# Patient Record
Sex: MALE | Race: WHITE | Employment: OTHER | URBAN - METROPOLITAN AREA
[De-identification: names, ages, dates, MRNs, and addresses within clinical notes are randomized per-mention and may not be internally consistent; named-entity substitution may affect disease eponyms.]

---

## 2021-07-23 LAB
ALBUMIN SERPL-MCNC: 4.5 G/DL
ALP BLD-CCNC: 71 U/L
ALT SERPL-CCNC: 71 U/L
ANION GAP SERPL CALCULATED.3IONS-SCNC: NORMAL MMOL/L
ANTIBODY: NON REACTIVE
AST SERPL-CCNC: 19 U/L
BASOPHILS ABSOLUTE: 0.3 /ΜL
BASOPHILS RELATIVE PERCENT: 0.7 %
BILIRUB SERPL-MCNC: 0.4 MG/DL (ref 0.1–1.4)
BUN BLDV-MCNC: 14 MG/DL
CALCIUM SERPL-MCNC: 9.5 MG/DL
CHLORIDE BLD-SCNC: 101 MMOL/L
CHOLESTEROL, TOTAL: 187 MG/DL
CHOLESTEROL/HDL RATIO: 2.47
CO2: 30 MMOL/L
CREAT SERPL-MCNC: 0.9 MG/DL
EOSINOPHILS ABSOLUTE: 0.5 /ΜL
EOSINOPHILS RELATIVE PERCENT: 4.6 %
GFR CALCULATED: >90
GLUCOSE BLD-MCNC: 98 MG/DL
HCT VFR BLD CALC: 44.2 % (ref 41–53)
HDLC SERPL-MCNC: 43 MG/DL (ref 35–70)
HEMOGLOBIN: 14.8 G/DL (ref 13.5–17.5)
LDL CHOLESTEROL CALCULATED: 106 MG/DL (ref 0–160)
LYMPHOCYTES ABSOLUTE: 3 /ΜL
LYMPHOCYTES RELATIVE PERCENT: 42.4 %
MCH RBC QN AUTO: 29.2 PG
MCHC RBC AUTO-ENTMCNC: 33.5 G/DL
MCV RBC AUTO: 88.6 FL
MONOCYTES ABSOLUTE: 2.9 /ΜL
MONOCYTES RELATIVE PERCENT: 7.4 %
NEUTROPHILS ABSOLUTE: 0.1 /ΜL
NEUTROPHILS RELATIVE PERCENT: 44.8 %
NONHDLC SERPL-MCNC: NORMAL MG/DL
PDW BLD-RTO: 13.2 %
PLATELET # BLD: 246 K/ΜL
PMV BLD AUTO: 10 FL
POTASSIUM SERPL-SCNC: 3.2 MMOL/L
PROSTATE SPECIFIC ANTIGEN: 1.29 NG/ML
RBC # BLD: 4.99 10^6/ΜL
SODIUM BLD-SCNC: 141 MMOL/L
TOTAL PROTEIN: 7
TRIGL SERPL-MCNC: 188 MG/DL
VLDLC SERPL CALC-MCNC: 38 MG/DL
WBC # BLD: 6.8 10^3/ML

## 2022-05-22 SDOH — HEALTH STABILITY: PHYSICAL HEALTH: ON AVERAGE, HOW MANY DAYS PER WEEK DO YOU ENGAGE IN MODERATE TO STRENUOUS EXERCISE (LIKE A BRISK WALK)?: 3 DAYS

## 2022-05-22 SDOH — HEALTH STABILITY: PHYSICAL HEALTH: ON AVERAGE, HOW MANY MINUTES DO YOU ENGAGE IN EXERCISE AT THIS LEVEL?: 40 MIN

## 2022-05-22 ASSESSMENT — SOCIAL DETERMINANTS OF HEALTH (SDOH)

## 2022-05-25 ENCOUNTER — OFFICE VISIT (OUTPATIENT)
Dept: INTERNAL MEDICINE CLINIC | Age: 57
End: 2022-05-25
Payer: COMMERCIAL

## 2022-05-25 VITALS
BODY MASS INDEX: 34.13 KG/M2 | HEART RATE: 78 BPM | DIASTOLIC BLOOD PRESSURE: 68 MMHG | HEIGHT: 72 IN | SYSTOLIC BLOOD PRESSURE: 112 MMHG | OXYGEN SATURATION: 94 % | WEIGHT: 252 LBS

## 2022-05-25 DIAGNOSIS — Z11.4 SCREENING FOR HIV (HUMAN IMMUNODEFICIENCY VIRUS): ICD-10-CM

## 2022-05-25 DIAGNOSIS — I10 PRIMARY HYPERTENSION: ICD-10-CM

## 2022-05-25 DIAGNOSIS — K22.2 ESOPHAGEAL STRICTURE: ICD-10-CM

## 2022-05-25 DIAGNOSIS — Z12.11 SCREEN FOR COLON CANCER: ICD-10-CM

## 2022-05-25 DIAGNOSIS — Z00.00 ANNUAL PHYSICAL EXAM: Primary | ICD-10-CM

## 2022-05-25 DIAGNOSIS — G47.33 OSA TREATED WITH BIPAP: ICD-10-CM

## 2022-05-25 DIAGNOSIS — F98.8 ATTENTION DEFICIT DISORDER (ADD) WITHOUT HYPERACTIVITY: ICD-10-CM

## 2022-05-25 DIAGNOSIS — J30.89 ENVIRONMENTAL AND SEASONAL ALLERGIES: ICD-10-CM

## 2022-05-25 DIAGNOSIS — K21.9 CHRONIC GASTROESOPHAGEAL REFLUX DISEASE: ICD-10-CM

## 2022-05-25 PROCEDURE — 36415 COLL VENOUS BLD VENIPUNCTURE: CPT | Performed by: INTERNAL MEDICINE

## 2022-05-25 PROCEDURE — 99386 PREV VISIT NEW AGE 40-64: CPT | Performed by: INTERNAL MEDICINE

## 2022-05-25 RX ORDER — LOSARTAN POTASSIUM AND HYDROCHLOROTHIAZIDE 25; 100 MG/1; MG/1
1 TABLET ORAL DAILY
Qty: 90 TABLET | Refills: 1 | Status: SHIPPED | OUTPATIENT
Start: 2022-05-25

## 2022-05-25 RX ORDER — HYDROCHLOROTHIAZIDE 25 MG/1
25 TABLET ORAL DAILY
COMMUNITY
Start: 2022-05-08 | End: 2022-05-25 | Stop reason: ALTCHOICE

## 2022-05-25 RX ORDER — OMEPRAZOLE 40 MG/1
40 CAPSULE, DELAYED RELEASE ORAL DAILY
Qty: 90 CAPSULE | Refills: 3 | Status: SHIPPED | OUTPATIENT
Start: 2022-05-25 | End: 2022-09-28

## 2022-05-25 RX ORDER — LOSARTAN POTASSIUM 100 MG/1
100 TABLET ORAL DAILY
COMMUNITY
Start: 2022-05-08 | End: 2022-05-25 | Stop reason: ALTCHOICE

## 2022-05-25 RX ORDER — OMEPRAZOLE 40 MG/1
40 CAPSULE, DELAYED RELEASE ORAL DAILY
COMMUNITY
Start: 2022-04-28 | End: 2022-05-25 | Stop reason: SDUPTHER

## 2022-05-25 SDOH — ECONOMIC STABILITY: FOOD INSECURITY: WITHIN THE PAST 12 MONTHS, YOU WORRIED THAT YOUR FOOD WOULD RUN OUT BEFORE YOU GOT MONEY TO BUY MORE.: NEVER TRUE

## 2022-05-25 SDOH — ECONOMIC STABILITY: FOOD INSECURITY: WITHIN THE PAST 12 MONTHS, THE FOOD YOU BOUGHT JUST DIDN'T LAST AND YOU DIDN'T HAVE MONEY TO GET MORE.: NEVER TRUE

## 2022-05-25 ASSESSMENT — PATIENT HEALTH QUESTIONNAIRE - PHQ9
SUM OF ALL RESPONSES TO PHQ QUESTIONS 1-9: 0
2. FEELING DOWN, DEPRESSED OR HOPELESS: 0
SUM OF ALL RESPONSES TO PHQ QUESTIONS 1-9: 0
SUM OF ALL RESPONSES TO PHQ9 QUESTIONS 1 & 2: 0
SUM OF ALL RESPONSES TO PHQ QUESTIONS 1-9: 0
SUM OF ALL RESPONSES TO PHQ QUESTIONS 1-9: 0
1. LITTLE INTEREST OR PLEASURE IN DOING THINGS: 0

## 2022-05-25 ASSESSMENT — SOCIAL DETERMINANTS OF HEALTH (SDOH): HOW HARD IS IT FOR YOU TO PAY FOR THE VERY BASICS LIKE FOOD, HOUSING, MEDICAL CARE, AND HEATING?: NOT HARD AT ALL

## 2022-05-25 NOTE — PROGRESS NOTES
[facility]  History and Physical  Emma Stiles M.D. Porfirio Hood  YOB: 1965    Date of Service:  2022    Chief Complaint:   Porfirio Hood is a 60 y.o. male who presents for   Chief Complaint   Patient presents with    Establish Care       Assessment/Plan:    Dyllan Kendrick was seen today for establish care. Diagnoses and all orders for this visit:    Annual physical exam  -     Lipid Panel  -     Comprehensive Metabolic Panel  -     CBC with Auto Differential    Screening for HIV (human immunodeficiency virus)  -     HIV Screen    Screen for colon cancer  -     AFL - Ronni Flowers MD, Gastroenterology (ERCP & EUS), [facility]    Primary hypertension  -     losartan-hydroCHLOROthiazide (HYZAAR) 100-25 MG per tablet; Take 1 tablet by mouth daily    Esophageal stricture  -     omeprazole (PRILOSEC) 40 MG delayed release capsule; Take 1 capsule by mouth daily    Chronic gastroesophageal reflux disease  -     omeprazole (PRILOSEC) 40 MG delayed release capsule; Take 1 capsule by mouth daily    Environmental and seasonal allergies    NATE treated with BiPAP    Attention deficit disorder (ADD) without hyperactivity      Return VV 6 months on BP, GERD, allergies. HPI: Here for Annual Physical and Follow up. Hypertension:  Home blood pressure monitorin-130/80's on Losartan 100 mg daily and hctz 25 mg qd. He is adherent to a low sodium diet. Patient denies chest pain, shortness of breath, headache, lightheadedness, blurred vision, peripheral edema, palpitations, dry cough and fatigue. Antihypertensive medication side effects: no medication side effects noted. Use of agents associated with hypertension: none. GERD with esophageal stricture:  Stable on Prilosec 40 mg qd                                   Environmental allergies:  Stable on zyrtec 10 mg daily  NATE on BIPAP  ADD:  Stable off medication.     No results found for: [facility] Results Component Value Date     07/23/2021    K 3.2 07/23/2021     07/23/2021    CO2 30 07/23/2021    BUN 14 07/23/2021    CREATININE 0.9 07/23/2021    GLUCOSE 98 07/23/2021    CALCIUM 9.5 07/23/2021     Lab Results   Component Value Date    CHOL 187 07/23/2021    TRIG 188 07/23/2021    HDL 43 07/23/2021    LDLCALC 106 07/23/2021     Lab Results   Component Value Date    ALT 71 07/23/2021    AST 19 07/23/2021     No results found for: TSH, T4FREE, T3FREE  Lab Results   Component Value Date    WBC 6.8 07/23/2021    HGB 14.8 07/23/2021    HCT 44.2 07/23/2021    MCV 88.6 07/23/2021     07/23/2021     No results found for: INR   Lab Results   Component Value Date    PSA 1.29 07/23/2021      No results found for: LABURIC     Wt Readings from Last 3 Encounters:   05/25/22 252 lb (114.3 kg)     BP Readings from Last 3 Encounters:   05/25/22 112/68       Patient Active Problem List   Diagnosis    Primary hypertension    Esophageal stricture    Chronic gastroesophageal reflux disease    Attention deficit disorder (ADD) without hyperactivity    Environmental and seasonal allergies       No Known Allergies  Outpatient Medications Marked as Taking for the 5/25/22 encounter (Office Visit) with Kurtis Torres MD   Medication Sig Dispense Refill    Cetirizine HCl 10 MG CAPS Take by mouth daily      hydroCHLOROthiazide (HYDRODIURIL) 25 MG tablet Take 25 mg by mouth daily      losartan (COZAAR) 100 MG tablet Take 100 mg by mouth daily      omeprazole (PRILOSEC) 40 MG delayed release capsule Take 40 mg by mouth daily         Past Medical History:   Diagnosis Date    Esophageal stricture     Hypertension     Sleep apnea      Past Surgical History:   Procedure Laterality Date    CHOLECYSTECTOMY  2015     Family History   Problem Relation Age of Onset    Thyroid Disease Mother     Ulcerative Colitis Father     Depression Sister     Heart Attack Maternal Grandmother 48        had MI and passed    Depression Sister     Depression Sister     ADHD Sister      Social History     Socioeconomic History    Marital status:      Spouse name: Not on file    Number of children: Not on file    Years of education: Not on file    Highest education level: Not on file   Occupational History    Not on file   Tobacco Use    Smoking status: Former Smoker     Packs/day: 1.00     Years: 10.00     Pack years: 10.00     Types: Cigarettes     Start date: 1979     Quit date: 2000     Years since quittin.7    Smokeless tobacco: Never Used    Tobacco comment: Stopped for a period of time of 2 years or more 3 times before I quit permanently. Vaping Use    Vaping Use: Never used   Substance and Sexual Activity    Alcohol use: Yes     Alcohol/week: 3.0 standard drinks     Types: 1 Glasses of wine, 2 Standard drinks or equivalent per week     Comment: occasional    Drug use: Never    Sexual activity: Yes     Partners: Female   Other Topics Concern    Not on file   Social History Narrative    Not on file     Social Determinants of Health     Financial Resource Strain: Low Risk     Difficulty of Paying Living Expenses: Not hard at all   Food Insecurity: No Food Insecurity    Worried About [facility] in the Last Year: Never true    [facility] in the Last Year: Never true   Transportation Needs:     Lack of Transportation (Medical): Not on file    Lack of Transportation (Non-Medical):  Not on file   Physical Activity: Insufficiently Active    Days of Exercise per Week: 3 days    Minutes of Exercise per Session: 40 min   Stress:     Feeling of Stress : Not on file   Social Connections:     Frequency of Communication with Friends and Family: Not on file    Frequency of Social Gatherings with Friends and Family: Not on file    Attends [employer] Services: Not on file    Active Member of Clubs or Organizations: Not on file    Attends Club or Organization Meetings: Not on file    Marital Status: Not on file   Intimate Partner Violence: Not At Risk    Fear of Current or Ex-Partner: No    Emotionally Abused: No    Physically Abused: No    Sexually Abused: No   Housing Stability:     Unable to Pay for Housing in the Last Year: Not on file    Number of Places Lived in the Last Year: Not on file    Unstable Housing in the Last Year: Not on file       Review of Systems:  A comprehensive review of systems was negative except for what was noted in the HPI. Physical Exam:   Vitals:    05/25/22 1017   BP: 112/68   Pulse: 78   SpO2: 94%   Weight: 252 lb (114.3 kg)   Height: 6' (1.829 m)     Body mass index is 34.18 kg/m². Constitutional: He is oriented to person, place, and time. He appears well-developed and well-nourished. No distress. HEENT:   Head: Normocephalic and atraumatic. Right Ear: Tympanic membrane, external ear and ear canal normal.   Left Ear: Tympanic membrane, external ear and ear canal normal.   Mouth/Throat: Oropharynx is clear and moist and mucous membranes are normal. No oropharyngeal exudate or posterior oropharyngeal erythema. He has no cervical adenopathy. Eyes: Conjunctivae and extraocular motions are normal. Pupils are equal, round, and reactive to light. Neck:  Supple. No JVD present. Carotid bruit is not present. No mass and no thyromegaly present. Cardiovascular: Normal rate, regular rhythm, normal heart sounds and intact distal pulses. Pulmonary/Chest: Effort normal and breath sounds normal. No respiratory distress. He has no wheezes, rhonchi or rales. Abdominal: Soft, non-tender. Bowel sounds and aorta are normal. There is no organomegaly, mass or bruit. Musculoskeletal: Normal range of motion. He exhibits no edema. Neurological: He is alert and oriented to person, place, and time. He has normal reflexes. No cranial nerve deficit. Coordination normal.   Skin: Skin is warm, dry and intact. No suspicious lesions are noted.     Preventive Care:  Health Maintenance   Topic Date Due    Depression Screen  Never done    HIV screen  Never done    Colorectal Cancer Screen  Never done    Prostate Specific Antigen (PSA) Screening or Monitoring  07/23/2022    DTaP/Tdap/Td vaccine (2 - Td or Tdap) 09/16/2023    Lipids  07/23/2026    Flu vaccine  Completed    Shingles vaccine  Completed    COVID-19 Vaccine  Completed    Hepatitis C screen  Completed    Hepatitis A vaccine  Aged Out    Hepatitis B vaccine  Aged Out    Hib vaccine  Aged Out    Meningococcal (ACWY) vaccine  Aged Out    Pneumococcal 0-64 years Vaccine  Aged Out        Recommendations for WAYN Due: see orders and patient instructions/AVS.

## 2022-05-26 DIAGNOSIS — E78.00 HYPERCHOLESTEREMIA: Primary | ICD-10-CM

## 2022-05-26 DIAGNOSIS — Z91.89 RISK FOR CORONARY ARTERY DISEASE LESS THAN 10% IN NEXT 10 YEARS: ICD-10-CM

## 2022-05-26 LAB
A/G RATIO: 2 (ref 1.1–2.2)
ALBUMIN SERPL-MCNC: 5.1 G/DL (ref 3.4–5)
ALP BLD-CCNC: 89 U/L (ref 40–129)
ALT SERPL-CCNC: 25 U/L (ref 10–40)
ANION GAP SERPL CALCULATED.3IONS-SCNC: 23 MMOL/L (ref 3–16)
AST SERPL-CCNC: 22 U/L (ref 15–37)
BASOPHILS ABSOLUTE: 0 K/UL (ref 0–0.2)
BASOPHILS RELATIVE PERCENT: 0.5 %
BILIRUB SERPL-MCNC: 0.4 MG/DL (ref 0–1)
BUN BLDV-MCNC: 17 MG/DL (ref 7–20)
CALCIUM SERPL-MCNC: 10.1 MG/DL (ref 8.3–10.6)
CHLORIDE BLD-SCNC: 100 MMOL/L (ref 99–110)
CHOLESTEROL, TOTAL: 220 MG/DL (ref 0–199)
CO2: 22 MMOL/L (ref 21–32)
CREAT SERPL-MCNC: 1 MG/DL (ref 0.9–1.3)
EOSINOPHILS ABSOLUTE: 0.2 K/UL (ref 0–0.6)
EOSINOPHILS RELATIVE PERCENT: 3.3 %
GFR AFRICAN AMERICAN: >60
GFR NON-AFRICAN AMERICAN: >60
GLUCOSE BLD-MCNC: 104 MG/DL (ref 70–99)
HCT VFR BLD CALC: 44.1 % (ref 40.5–52.5)
HDLC SERPL-MCNC: 45 MG/DL (ref 40–60)
HEMOGLOBIN: 14.9 G/DL (ref 13.5–17.5)
HIV AG/AB: NORMAL
HIV ANTIGEN: NORMAL
HIV-1 ANTIBODY: NORMAL
HIV-2 AB: NORMAL
LDL CHOLESTEROL CALCULATED: 118 MG/DL
LYMPHOCYTES ABSOLUTE: 2.6 K/UL (ref 1–5.1)
LYMPHOCYTES RELATIVE PERCENT: 38.2 %
MCH RBC QN AUTO: 29.9 PG (ref 26–34)
MCHC RBC AUTO-ENTMCNC: 33.7 G/DL (ref 31–36)
MCV RBC AUTO: 88.5 FL (ref 80–100)
MONOCYTES ABSOLUTE: 0.5 K/UL (ref 0–1.3)
MONOCYTES RELATIVE PERCENT: 7.4 %
NEUTROPHILS ABSOLUTE: 3.4 K/UL (ref 1.7–7.7)
NEUTROPHILS RELATIVE PERCENT: 50.6 %
PDW BLD-RTO: 13.6 % (ref 12.4–15.4)
PLATELET # BLD: 242 K/UL (ref 135–450)
PMV BLD AUTO: 8.1 FL (ref 5–10.5)
POTASSIUM SERPL-SCNC: 3.8 MMOL/L (ref 3.5–5.1)
RBC # BLD: 4.98 M/UL (ref 4.2–5.9)
SODIUM BLD-SCNC: 145 MMOL/L (ref 136–145)
TOTAL PROTEIN: 7.6 G/DL (ref 6.4–8.2)
TRIGL SERPL-MCNC: 284 MG/DL (ref 0–150)
VLDLC SERPL CALC-MCNC: 57 MG/DL
WBC # BLD: 6.7 K/UL (ref 4–11)

## 2022-05-26 RX ORDER — ROSUVASTATIN CALCIUM 5 MG/1
5 TABLET, COATED ORAL NIGHTLY
Qty: 90 TABLET | Refills: 1 | Status: SHIPPED | OUTPATIENT
Start: 2022-05-26 | End: 2022-09-28

## 2022-09-28 RX ORDER — OMEPRAZOLE 40 MG/1
40 CAPSULE, DELAYED RELEASE ORAL DAILY
COMMUNITY

## 2022-09-30 ENCOUNTER — ANESTHESIA EVENT (OUTPATIENT)
Dept: ENDOSCOPY | Age: 57
End: 2022-09-30
Payer: COMMERCIAL

## 2022-10-02 NOTE — H&P
Depression Sister     Depression Sister     ADHD Sister      SOCIAL HISTORY     Social History     Socioeconomic History    Marital status:      Spouse name: Not on file    Number of children: Not on file    Years of education: Not on file    Highest education level: Not on file   Occupational History    Not on file   Tobacco Use    Smoking status: Former     Packs/day: 1.00     Years: 10.00     Pack years: 10.00     Types: Cigarettes     Start date: 1979     Quit date: 2000     Years since quittin.0    Smokeless tobacco: Never    Tobacco comments:     Stopped for a period of time of 2 years or more 3 times before I quit permanently. Vaping Use    Vaping Use: Never used   Substance and Sexual Activity    Alcohol use:  Yes     Alcohol/week: 3.0 standard drinks     Types: 1 Glasses of wine, 2 Standard drinks or equivalent per week     Comment: 3 drinks per week    Drug use: Never    Sexual activity: Yes     Partners: Female   Other Topics Concern    Not on file   Social History Narrative    Not on file     Social Determinants of Health     Financial Resource Strain: Low Risk     Difficulty of Paying Living Expenses: Not hard at all   Food Insecurity: No Food Insecurity    Worried About Running Out of Food in the Last Year: Never true    Ran Out of Food in the Last Year: Never true   Transportation Needs: Not on file   Physical Activity: Insufficiently Active    Days of Exercise per Week: 3 days    Minutes of Exercise per Session: 40 min   Stress: Not on file   Social Connections: Not on file   Intimate Partner Violence: Not At Risk    Fear of Current or Ex-Partner: No    Emotionally Abused: No    Physically Abused: No    Sexually Abused: No   Housing Stability: Not on file     SURGICAL HISTORY     Past Surgical History:   Procedure Laterality Date    CHOLECYSTECTOMY       CURRENT MEDICATIONS   (This list may include medications prescribed during this encounter as epic can not insert only the list prior to this encounter.)  Current Outpatient Rx   Medication Sig Dispense Refill    omeprazole (PRILOSEC) 40 MG delayed release capsule Take 40 mg by mouth daily      Multiple Vitamin (MULTIVITAMIN PO) Take by mouth daily      Cetirizine HCl 10 MG CAPS Take by mouth daily      losartan-hydroCHLOROthiazide (HYZAAR) 100-25 MG per tablet Take 1 tablet by mouth daily 90 tablet 1     ALLERGIES   No Known Allergies  IMMUNIZATIONS     Immunization History   Administered Date(s) Administered    COVID-19, PFIZER PURPLE top, DILUTE for use, (age 15 y+), 30mcg/0.3mL 03/18/2021, 04/06/2021, 12/08/2021     REVIEW OF SYSTEMS   See HPI for further details and pertinent postiives. Negative for the following:  Constitutional: Negative for weight change. Negative for appetite change and fatigue. HENT: Negative for nosebleeds, sore throat, mouth sores, and voice change. Respiratory: Negative for cough, choking and chest tightness. Cardiovascular: Negative for chest pain   Gastrointestinal: See HPI  Musculoskeletal: Negative for arthralgias. Skin: Negative for pallor. Neurological: Negative for weakness and light-headedness. Hematological: Negative for adenopathy. Does not bruise/bleed easily. Psychiatric/Behavioral: Negative for suicidal ideas. PHYSICAL EXAM   VITAL SIGNS: Ht 6' (1.829 m)   Wt 250 lb (113.4 kg)   BMI 33.91 kg/m²   Wt Readings from Last 3 Encounters:   05/25/22 252 lb (114.3 kg)     Constitutional: Well developed, Well nourished, No acute distress, Non-toxic appearance. HENT: Normocephalic, Atraumatic, Bilateral external ears normal, Oropharynx moist, No oral exudates, Nose normal.   Eyes: Conjunctiva normal, No discharge. Neck: Normal range of motion, No tenderness, Supple  Cardiovascular: Normal heart rate, Normal rhythm, No murmurs, No rubs, No gallops. Thorax & Lungs: Normal breath sounds, No respiratory distress, No wheezing, No chest tenderness.   Abdomen: normal bowel sounds, soft, non tender, non distended, no hernias   Rectal:  Deferred. Skin: Warm, Dry, No erythema, No rash. Lower Extremities: Intact distal pulses, No edema, No tenderness, No cyanosis, No clubbing. Neurologic: Alert & oriented x 3  RADIOLOGY/PROCEDURES   No results found. FINAL IMPRESSION   History of colon polyp    Plan: colonoscopy    Colonoscopy with alternatives, rationale, benefits and risks, not limited to bleeding, infection, perforation, need of surgery, prolong hospital stay and anesthesia side effects were explained. Patient verbalized understanding and agrees to proceed with colonoscopy. ORDERED FUTURE/PENDING TESTS       FOLLOWUP   No follow-ups on file.           Stewart Scott MD 10/2/22 1:32 PM EDT    CC:  Kimberley Gonzalez MD

## 2022-10-03 ENCOUNTER — ANESTHESIA (OUTPATIENT)
Dept: ENDOSCOPY | Age: 57
End: 2022-10-03
Payer: COMMERCIAL

## 2022-10-03 ENCOUNTER — HOSPITAL ENCOUNTER (OUTPATIENT)
Age: 57
Setting detail: OUTPATIENT SURGERY
Discharge: HOME | End: 2022-10-03
Attending: INTERNAL MEDICINE | Admitting: INTERNAL MEDICINE
Payer: COMMERCIAL

## 2022-10-03 VITALS
BODY MASS INDEX: 33.86 KG/M2 | HEART RATE: 60 BPM | OXYGEN SATURATION: 96 % | RESPIRATION RATE: 20 BRPM | TEMPERATURE: 97.2 F | SYSTOLIC BLOOD PRESSURE: 125 MMHG | WEIGHT: 250 LBS | HEIGHT: 72 IN | DIASTOLIC BLOOD PRESSURE: 86 MMHG

## 2022-10-03 DIAGNOSIS — Z12.11 SCREENING FOR COLON CANCER: ICD-10-CM

## 2022-10-03 PROCEDURE — 3700000000 HC ANESTHESIA ATTENDED CARE: Performed by: INTERNAL MEDICINE

## 2022-10-03 PROCEDURE — 2720000010 HC SURG SUPPLY STERILE: Performed by: INTERNAL MEDICINE

## 2022-10-03 PROCEDURE — 3609010600 HC COLONOSCOPY POLYPECTOMY SNARE/COLD BIOPSY: Performed by: INTERNAL MEDICINE

## 2022-10-03 PROCEDURE — 88305 TISSUE EXAM BY PATHOLOGIST: CPT

## 2022-10-03 PROCEDURE — 3609010300 HC COLONOSCOPY W/BIOPSY SINGLE/MULTIPLE: Performed by: INTERNAL MEDICINE

## 2022-10-03 PROCEDURE — 2709999900 HC NON-CHARGEABLE SUPPLY: Performed by: INTERNAL MEDICINE

## 2022-10-03 PROCEDURE — 3700000001 HC ADD 15 MINUTES (ANESTHESIA): Performed by: INTERNAL MEDICINE

## 2022-10-03 PROCEDURE — 7100000010 HC PHASE II RECOVERY - FIRST 15 MIN: Performed by: INTERNAL MEDICINE

## 2022-10-03 PROCEDURE — 6360000002 HC RX W HCPCS

## 2022-10-03 PROCEDURE — 2580000003 HC RX 258: Performed by: INTERNAL MEDICINE

## 2022-10-03 PROCEDURE — 2500000003 HC RX 250 WO HCPCS

## 2022-10-03 PROCEDURE — 3609009900 HC COLONOSCOPY W/CONTROL BLEEDING ANY METHOD: Performed by: INTERNAL MEDICINE

## 2022-10-03 PROCEDURE — 7100000011 HC PHASE II RECOVERY - ADDTL 15 MIN: Performed by: INTERNAL MEDICINE

## 2022-10-03 PROCEDURE — 3609019800 HC COLONOSCOPY WITH SUBMUCOSAL INJECTION: Performed by: INTERNAL MEDICINE

## 2022-10-03 RX ORDER — LIDOCAINE HYDROCHLORIDE 10 MG/ML
INJECTION, SOLUTION INFILTRATION; PERINEURAL PRN
Status: DISCONTINUED | OUTPATIENT
Start: 2022-10-03 | End: 2022-10-03 | Stop reason: SDUPTHER

## 2022-10-03 RX ORDER — GLYCOPYRROLATE 0.2 MG/ML
INJECTION INTRAMUSCULAR; INTRAVENOUS PRN
Status: DISCONTINUED | OUTPATIENT
Start: 2022-10-03 | End: 2022-10-03 | Stop reason: SDUPTHER

## 2022-10-03 RX ORDER — PROPOFOL 10 MG/ML
INJECTION, EMULSION INTRAVENOUS PRN
Status: DISCONTINUED | OUTPATIENT
Start: 2022-10-03 | End: 2022-10-03 | Stop reason: SDUPTHER

## 2022-10-03 RX ORDER — SODIUM CHLORIDE 0.9 % (FLUSH) 0.9 %
5-40 SYRINGE (ML) INJECTION PRN
Status: DISCONTINUED | OUTPATIENT
Start: 2022-10-03 | End: 2022-10-03 | Stop reason: HOSPADM

## 2022-10-03 RX ORDER — SODIUM CHLORIDE 9 MG/ML
INJECTION, SOLUTION INTRAVENOUS PRN
Status: DISCONTINUED | OUTPATIENT
Start: 2022-10-03 | End: 2022-10-03 | Stop reason: HOSPADM

## 2022-10-03 RX ORDER — LIDOCAINE HYDROCHLORIDE 10 MG/ML
0.1 INJECTION, SOLUTION EPIDURAL; INFILTRATION; INTRACAUDAL; PERINEURAL
Status: DISCONTINUED | OUTPATIENT
Start: 2022-10-03 | End: 2022-10-03 | Stop reason: HOSPADM

## 2022-10-03 RX ORDER — SODIUM CHLORIDE, SODIUM LACTATE, POTASSIUM CHLORIDE, CALCIUM CHLORIDE 600; 310; 30; 20 MG/100ML; MG/100ML; MG/100ML; MG/100ML
INJECTION, SOLUTION INTRAVENOUS ONCE
Status: COMPLETED | OUTPATIENT
Start: 2022-10-03 | End: 2022-10-03

## 2022-10-03 RX ORDER — SODIUM CHLORIDE, SODIUM LACTATE, POTASSIUM CHLORIDE, CALCIUM CHLORIDE 600; 310; 30; 20 MG/100ML; MG/100ML; MG/100ML; MG/100ML
INJECTION, SOLUTION INTRAVENOUS CONTINUOUS
Status: DISCONTINUED | OUTPATIENT
Start: 2022-10-03 | End: 2022-10-03 | Stop reason: HOSPADM

## 2022-10-03 RX ORDER — SODIUM CHLORIDE 0.9 % (FLUSH) 0.9 %
5-40 SYRINGE (ML) INJECTION EVERY 12 HOURS SCHEDULED
Status: DISCONTINUED | OUTPATIENT
Start: 2022-10-03 | End: 2022-10-03 | Stop reason: HOSPADM

## 2022-10-03 RX ADMIN — PROPOFOL 50 MG: 10 INJECTION, EMULSION INTRAVENOUS at 12:19

## 2022-10-03 RX ADMIN — PROPOFOL 100 MG: 10 INJECTION, EMULSION INTRAVENOUS at 12:09

## 2022-10-03 RX ADMIN — GLYCOPYRROLATE 0.2 MG: 0.2 INJECTION, SOLUTION INTRAMUSCULAR; INTRAVENOUS at 12:14

## 2022-10-03 RX ADMIN — PROPOFOL 50 MG: 10 INJECTION, EMULSION INTRAVENOUS at 12:15

## 2022-10-03 RX ADMIN — GLYCOPYRROLATE 0.2 MG: 0.2 INJECTION, SOLUTION INTRAMUSCULAR; INTRAVENOUS at 12:22

## 2022-10-03 RX ADMIN — LIDOCAINE HYDROCHLORIDE 30 MG: 10 INJECTION, SOLUTION INFILTRATION; PERINEURAL at 12:09

## 2022-10-03 RX ADMIN — PROPOFOL 50 MG: 10 INJECTION, EMULSION INTRAVENOUS at 12:36

## 2022-10-03 RX ADMIN — PROPOFOL 50 MG: 10 INJECTION, EMULSION INTRAVENOUS at 12:34

## 2022-10-03 RX ADMIN — PROPOFOL 50 MG: 10 INJECTION, EMULSION INTRAVENOUS at 12:28

## 2022-10-03 RX ADMIN — PROPOFOL 50 MG: 10 INJECTION, EMULSION INTRAVENOUS at 12:32

## 2022-10-03 RX ADMIN — PROPOFOL 50 MG: 10 INJECTION, EMULSION INTRAVENOUS at 12:22

## 2022-10-03 RX ADMIN — PROPOFOL 50 MG: 10 INJECTION, EMULSION INTRAVENOUS at 12:12

## 2022-10-03 RX ADMIN — PROPOFOL 50 MG: 10 INJECTION, EMULSION INTRAVENOUS at 12:17

## 2022-10-03 RX ADMIN — SODIUM CHLORIDE, POTASSIUM CHLORIDE, SODIUM LACTATE AND CALCIUM CHLORIDE: 600; 310; 30; 20 INJECTION, SOLUTION INTRAVENOUS at 11:34

## 2022-10-03 ASSESSMENT — PAIN - FUNCTIONAL ASSESSMENT: PAIN_FUNCTIONAL_ASSESSMENT: 0-10

## 2022-10-03 NOTE — ANESTHESIA POSTPROCEDURE EVALUATION
Department of Anesthesiology  Postprocedure Note    Patient: Xin Hogan  MRN: [id number]  YOB: 1965  Date of evaluation: 10/3/2022      Procedure Summary     Date: 10/03/22 Room / Location: [place] [address] / [facility]    Anesthesia Start: 0468 Anesthesia Stop: 1246    Procedures:       COLONOSCOPY WITH BIOPSY      COLONOSCOPY SUBMUCOSAL/ORISE INJECTION      COLONOSCOPY POLYPECTOMY SNARE/HOT BIOPSY      COLONOSCOPY CONTROL HEMORRHAGE Diagnosis:       Screening for colon cancer      (Screening for colon cancer [Z12.11])    Surgeons: Felecia Miller MD Responsible Provider: Armando Azevedo MD    Anesthesia Type: general ASA Status: 2          Anesthesia Type: No value filed.     Matthieu Phase I: Matthieu Score: 10    Matthieu Phase II: Matthieu Score: 9      Anesthesia Post Evaluation    Patient location during evaluation: PACU  Patient participation: complete - patient participated  Level of consciousness: awake and alert  Airway patency: patent  Nausea & Vomiting: no nausea and no vomiting  Complications: no  Cardiovascular status: hemodynamically stable  Respiratory status: acceptable, room air and spontaneous ventilation  Hydration status: stable  Comments: --------------------            10/03/22               1310     --------------------   BP:       125/86     Pulse:      60       Resp:       20       Temp:                SpO2:      96%      --------------------

## 2022-10-03 NOTE — DISCHARGE INSTRUCTIONS
PATIENT INSTRUCTIONS  POST-SEDATION    Yamilet Emms          IMMEDIATELY FOLLOWING PROCEDURE:    Do not drive or operate machinery for the first twenty four hours after surgery. Do not make any important decisions for twenty four hours after surgery or while taking narcotic pain medications or sedatives. You should NOT BE LEFT UNATTENDED OR ALONE. A responsible adult should be with you for the rest of the day of your procedure and also during the night for your protection and safety. You may experience some light headedness. Rest at home with activity as tolerated. You may not need to go to bed, but it is important to rest for the next 24 hours. You should not engage in athletic sports such as basketball, volleyball, jogging, skating, or activities requiring refined motor skills for 24 hours. If you develop intractable nausea and vomiting or a severe headache please notify your doctor immediately. You are not expected to have any fever, but if you feel warm, take your temperature. If you have a fever 101 degrees or higher, call your doctor. If you have had an Endoscopy:   *Eat lightly for your first meal and gradually resume your normal / prescribed diet. *If you have had a colonoscopy, do not expect a normal bowel movement for approximately three days due to the cleansing of the large intestine prior to colonoscopy. ONCE YOU ARE HOME, IF YOU SHOULD HAVE:  Difficulty in breathing, persistent nausea or vomiting, bleeding you feel is excessive, or pain that is unusual, increased abdominal bloating, or any swelling, fever / chills, call your physician. If you cannot contact your physician, but feel that your signs and symptoms need a physician's attention, go to the Emergency Department. FOLLOW-UP:    Please follow up with [email or web address] as scheduled or needed. Dr. Katty Chegn MD will call you with the biopsy findings. Call Dr. Katty Cheng MD if there are any GI concerns. [phone]    Repeat Colonoscopy pending pathology     You may be receiving a follow up phone call to ask about your care. Colonoscopy: What to Expect at [facility]  After you have a colonoscopy, you will stay at the clinic for 1 to 2 hours until the medicines wear off. Then you can go home. But you will need to arrange for a ride. Your doctor will tell you when you can eat and do your other usual activities. Your doctor will talk to you about when you will need your next colonoscopy. Your doctor can help you decide how often you need to be checked. This will depend on the results of your test and your risk for colorectal cancer. After the test, you may be bloated or have gas pains. You may need to pass gas. If a biopsy was done or a polyp was removed, you may have streaks of blood in your stool (feces) for a few days. Problems such as heavy rectal bleeding may not occur until several weeks after the test. This isn't common. But it can happen after polyps are removed. This care sheet gives you a general idea about how long it will take for you to recover. But each person recovers at a different pace. Follow the steps below to get better as quickly as possible. How can you care for yourself at home? Activity    Rest when you feel tired. You can do your normal activities when it feels okay to do so. Diet    Follow your doctor's directions for eating. Unless your doctor has told you not to, drink plenty of fluids. This helps to replace the fluids that were lost during the colon prep. Do not drink alcohol. Medicines    Your doctor will tell you if and when you can restart your medicines. He or she will also give you instructions about taking any new medicines. If you take blood thinners, such as warfarin (Coumadin), clopidogrel (Plavix), or aspirin, be sure to talk to your doctor. He or she will tell you if and when to start taking those medicines again.  Make sure that you understand exactly what your doctor wants you to do. If polyps were removed or a biopsy was done during the test, your doctor may tell you not to take aspirin or other anti-inflammatory medicines for a few days. These include ibuprofen (Advil, Motrin) and naproxen (Aleve). Other instructions    For your safety, do not drive or operate machinery until the medicine wears off and you can think clearly. Your doctor may tell you not to drive or operate machinery until the day after your test.     Do not sign legal documents or make major decisions until the medicine wears off and you can think clearly. The anesthesia can make it hard for you to fully understand what you are agreeing to. Follow-up care is a key part of your treatment and safety. Be sure to make and go to all appointments, and call your doctor if you are having problems. It's also a good idea to know your test results and keep a list of the medicines you take. When should you call for help? Call 911 anytime you think you may need emergency care. For example, call if:    You passed out (lost consciousness). You pass maroon or bloody stools. You have trouble breathing. Call your doctor now or seek immediate medical care if:    You have pain that does not get better after you take pain medicine. You are sick to your stomach or cannot drink fluids. You have new or worse belly pain. You have blood in your stools. You have a fever. You cannot pass stools or gas. Watch closely for changes in your health, and be sure to contact your doctor if you have any problems. Where can you learn more? Go to [email or web address] org and sign in to your [employer] account. Enter E264 in the [employer] box to learn more about \"Colonoscopy: What to Expect at Home. \"     If you do not have an account, please click on the \"Sign Up Now\" link. Current as of:  May 12, 2017  Content Version: 11.6  © 6056-1932 [employer], [employer]. Care instructions adapted under license by [facility] ([facility]). If you have questions about a medical condition or this instruction, always ask your healthcare professional. [address] any warranty or liability for your use of this information.

## 2022-10-03 NOTE — OP NOTE
[address] [facility] [address],  [address], [address]  Phone: [phone]   YKL:[phone]   [address]  [facility]  Phone: [phone]   SANDRA:[phone]      Colonoscopy Procedure Note    Patient: Tayo Yo  : 1965    Procedure: Colonoscopy with scopic mucosal resection, biopsy    Date:  10/3/2022     Endoscopist:  Walter Butler MD    Referring Physician:  Edinson Francois MD    Preoperative Diagnosis:  Screening for colon cancer [Z12.11]    Postoperative Diagnosis: Transverse colon polyp, sigmoid diverticulosis, internal hemorrhoids    Anesthesia: Anesthesia: MAC  Sedation:  Propofol per anesthesia  Start Time: 1210  Stop Time: 1239  ASA Class: 2  Mallampati: III (soft palate, base of uvula visible)    Indications: This is a 59y.o. year old male with medical history of hypertension, GERD,  NATE on BiPAP and colon polyp seen independently with referral for surveillance colonoscopy. Procedure Details  Informed consent was obtained for the procedure, including sedation. Risks of perforation, hemorrhage, adverse drug reaction and aspiration were discussed. The patient was placed in the left lateral decubitus position. Based on the pre-procedure assessment, including review of the patient's medical history, medications, allergies, and review of systems, he had been deemed to be an appropriate candidate for above IV sedation; he was therefore sedated and monitored continuously with ECG tracing, pulse oximetry, blood pressure monitoring, and direct observations. Rectal examination was performed. There were no external hemorrhoids, fissures or skin tags. The colonoscope was inserted into the rectum and advanced under direct vision to the terminal ileum. The right colon was examined twice as this increases polyp detection especially if other right colon polyps, older age, male, or bautista syndrome. The quality of the colonic preparation was excellent.   A careful inspection was made as the colonoscope was withdrawn, including a retroflexed view of the rectum; findings and interventions are described below. Appropriate photodocumentation Was Obtained: terminal ileum, appendiceal orifice and ileocecal valve. Findings:   Medium sized non bleeding diverticula in the sigmoid colon. A 20 mm broad-based semi-pedunculated polyp in the transverse colon, injected with 2 mL Orise and removed by en-bloc endoscopic mucosal resection and retrieved. No bleeding noted. 2 hemostatic clips applied for mucosal defect closure and prevent delayed bleeding. A 10 mm post polypectomy scar seen in the ascending colon with an adjacent 5 mm polypoid mucosa, removed by cold forceps and retrieved for pathology. No bleeding noted. Normal appearing terminal ileum. Medium sized non bleeding internal hemorrhoids.     - PREP: Suprep  - Overall difficulty: mild in degree  - Abdominal pressure: no   - Change in position: no  - Anesthesia issues: no    Specimens: Was Obtained:     Complications:   None; patient tolerated the procedure well. Disposition:   PACU - hemodynamically stable. Estimated Blood loss:  Minimal to none. Withdrawal Time:  24 minutes    Impression:   Medium sized non bleeding diverticula in the sigmoid colon. A 20 mm broad-based semi-pedunculated polyp in the transverse colon, injected and removed by en-bloc endoscopic mucosal resection and retrieved. 2 hemostatic clips applied  A 10 mm post polypectomy scar seen in the ascending colon with an adjacent 5 mm polypoid mucosa, removed. Normal appearing terminal ileum. Medium sized non bleeding internal hemorrhoids. Recommendations:  -Await pathology. ,   -Repeat colonoscopy in 3 years. ,   -High fiber diet. ,   -Follow up with primary care physician.  -Patient has  the contact number available for emergencies. Signs and symptoms of potential delayed complications were discussed with the patient.   Return to normal activities tomorrow. Written discharge instructions were provided to the patient. Samuel Godoy MD   [facility]   10/3/22       Please note that some or all of this record was generated using voice recognition software. If there are any questions about the content of this document, please contact the author as some errors in translation may have occurred.

## 2022-10-03 NOTE — ANESTHESIA PRE PROCEDURE
Department of Anesthesiology  Preprocedure Note       Name:  Montana Li   Age:  59 y.o.  :  1965                                          MRN:  [id number]         Date:  10/3/2022      Surgeon: Kunal Vu):  Mehran Camacho MD    Procedure: Procedure(s):  COLONOSCOPY    Medications prior to admission:   Prior to Admission medications    Medication Sig Start Date End Date Taking?  Authorizing Provider   omeprazole (PRILOSEC) 40 MG delayed release capsule Take 40 mg by mouth daily   Yes Historical Provider, MD   Multiple Vitamin (MULTIVITAMIN PO) Take by mouth daily   Yes Historical Provider, MD   Cetirizine HCl 10 MG CAPS Take by mouth daily    Historical Provider, MD   losartan-hydroCHLOROthiazide (HYZAAR) 100-25 MG per tablet Take 1 tablet by mouth daily 22   Zuleyma Torres MD       Current medications:    Current Facility-Administered Medications   Medication Dose Route Frequency Provider Last Rate Last Admin    lactated ringers infusion   IntraVENous Once Mehran Cmaacho MD        lidocaine PF 1 % injection 0.1 mL  0.1 mL IntraDERmal Once PRN Mehran Camacho MD        lactated ringers infusion   IntraVENous Continuous Mert Benitez MD        sodium chloride flush 0.9 % injection 5-40 mL  5-40 mL IntraVENous 2 times per day Mert Benitez MD        sodium chloride flush 0.9 % injection 5-40 mL  5-40 mL IntraVENous PRN Mert Benitez MD        0.9 % sodium chloride infusion   IntraVENous PRN Mert Benitez MD           Allergies:  No Known Allergies    Problem List:    Patient Active Problem List   Diagnosis Code    Primary hypertension I10    Esophageal stricture K22.2    Chronic gastroesophageal reflux disease K21.9    Attention deficit disorder (ADD) without hyperactivity F98.8    Environmental and seasonal allergies J30.89    NATE treated with BiPAP G47.33       Past Medical History:        Diagnosis Date    Acid reflux     Esophageal stricture     Hypertension  Sleep apnea     uses bipap-central sleep apnea       Past Surgical History:        Procedure Laterality Date    CHOLECYSTECTOMY  2015       Social History:    Social History     Tobacco Use    Smoking status: Former     Packs/day: 1.00     Years: 10.00     Pack years: 10.00     Types: Cigarettes     Start date: 1979     Quit date: 2000     Years since quittin.1    Smokeless tobacco: Never    Tobacco comments:     Stopped for a period of time of 2 years or more 3 times before I quit permanently. Substance Use Topics    Alcohol use: Yes     Alcohol/week: 3.0 standard drinks     Types: 1 Glasses of wine, 2 Standard drinks or equivalent per week     Comment: 3 drinks per week                                Counseling given: Not Answered  Tobacco comments: Stopped for a period of time of 2 years or more 3 times before I quit permanently. Vital Signs (Current):   Vitals:    22 1428 10/03/22 1128   BP:  133/82   Pulse:  65   Resp:  18   Temp:  97.2 °F (36.2 °C)   TempSrc:  Temporal   SpO2:  96%   Weight: 250 lb (113.4 kg) 250 lb (113.4 kg)   Height: 6' (1.829 m) 6' (1.829 m)                                              BP Readings from Last 3 Encounters:   10/03/22 133/82   22 112/68       NPO Status: Time of last liquid consumption: 0700                        Time of last solid consumption:                         Date of last liquid consumption: 10/03/22                        Date of last solid food consumption: 10/01/22    BMI:   Wt Readings from Last 3 Encounters:   10/03/22 250 lb (113.4 kg)   22 252 lb (114.3 kg)     Body mass index is 33.91 kg/m².     CBC:   Lab Results   Component Value Date/Time    WBC 6.7 2022 10:45 AM    RBC 4.98 2022 10:45 AM    HGB 14.9 2022 10:45 AM    HCT 44.1 2022 10:45 AM    MCV 88.5 2022 10:45 AM    RDW 13.6 2022 10:45 AM     2022 10:45 AM       CMP:   Lab Results   Component Value Date/Time     05/25/2022 10:45 AM    K 3.8 05/25/2022 10:45 AM     05/25/2022 10:45 AM    CO2 22 05/25/2022 10:45 AM    BUN 17 05/25/2022 10:45 AM    CREATININE 1.0 05/25/2022 10:45 AM    GFRAA >60 05/25/2022 10:45 AM    AGRATIO 2.0 05/25/2022 10:45 AM    LABGLOM >60 05/25/2022 10:45 AM    GLUCOSE 104 05/25/2022 10:45 AM    PROT 7.6 05/25/2022 10:45 AM    CALCIUM 10.1 05/25/2022 10:45 AM    BILITOT 0.4 05/25/2022 10:45 AM    ALKPHOS 89 05/25/2022 10:45 AM    AST 22 05/25/2022 10:45 AM    ALT 25 05/25/2022 10:45 AM       POC Tests: No results for input(s): POCGLU, POCNA, POCK, POCCL, POCBUN, POCHEMO, POCHCT in the last 72 hours. Coags: No results found for: PROTIME, INR, APTT    HCG (If Applicable): No results found for: PREGTESTUR, PREGSERUM, HCG, HCGQUANT     ABGs: No results found for: PHART, PO2ART, MFA6NPO, ZIW2WSC, BEART, H3VVPJMG     Type & Screen (If Applicable):  No results found for: LABABO, LABRH    Drug/Infectious Status (If Applicable):  No results found for: HIV, HEPCAB    COVID-19 Screening (If Applicable): No results found for: COVID19        Anesthesia Evaluation  Patient summary reviewed no history of anesthetic complications:   Airway: Mallampati: II  TM distance: >3 FB   Neck ROM: full  Mouth opening: > = 3 FB   Dental:          Pulmonary:normal exam  breath sounds clear to auscultation  (+) sleep apnea:                             Cardiovascular:    (+) hypertension:,         Rhythm: regular  Rate: normal                    Neuro/Psych:               GI/Hepatic/Renal:   (+) GERD:,           Endo/Other: Negative Endo/Other ROS                    Abdominal:             Vascular: Other Findings:           Anesthesia Plan      general     ASA 2       Induction: intravenous. Anesthetic plan and risks discussed with patient. Plan discussed with CRNA.     Attending anesthesiologist reviewed and agrees with Cesilia Reid MD 10/3/2022

## 2022-11-25 DIAGNOSIS — I10 PRIMARY HYPERTENSION: ICD-10-CM

## 2022-11-28 RX ORDER — LOSARTAN POTASSIUM AND HYDROCHLOROTHIAZIDE 25; 100 MG/1; MG/1
TABLET ORAL
Qty: 90 TABLET | Refills: 1 | OUTPATIENT
Start: 2022-11-28

## 2023-02-27 SDOH — ECONOMIC STABILITY: FOOD INSECURITY: WITHIN THE PAST 12 MONTHS, YOU WORRIED THAT YOUR FOOD WOULD RUN OUT BEFORE YOU GOT MONEY TO BUY MORE.: NEVER TRUE

## 2023-02-27 SDOH — ECONOMIC STABILITY: FOOD INSECURITY: WITHIN THE PAST 12 MONTHS, THE FOOD YOU BOUGHT JUST DIDN'T LAST AND YOU DIDN'T HAVE MONEY TO GET MORE.: NEVER TRUE

## 2023-02-27 SDOH — ECONOMIC STABILITY: HOUSING INSECURITY
IN THE LAST 12 MONTHS, WAS THERE A TIME WHEN YOU DID NOT HAVE A STEADY PLACE TO SLEEP OR SLEPT IN A SHELTER (INCLUDING NOW)?: NO

## 2023-02-27 SDOH — ECONOMIC STABILITY: TRANSPORTATION INSECURITY
IN THE PAST 12 MONTHS, HAS LACK OF TRANSPORTATION KEPT YOU FROM MEETINGS, WORK, OR FROM GETTING THINGS NEEDED FOR DAILY LIVING?: NO

## 2023-02-27 SDOH — ECONOMIC STABILITY: INCOME INSECURITY: HOW HARD IS IT FOR YOU TO PAY FOR THE VERY BASICS LIKE FOOD, HOUSING, MEDICAL CARE, AND HEATING?: NOT HARD AT ALL

## 2023-02-28 ENCOUNTER — TELEMEDICINE (OUTPATIENT)
Dept: INTERNAL MEDICINE CLINIC | Age: 58
End: 2023-02-28
Payer: COMMERCIAL

## 2023-02-28 DIAGNOSIS — Z91.89 RISK FOR CORONARY ARTERY DISEASE BETWEEN 10% AND 20% IN NEXT 10 YEARS: ICD-10-CM

## 2023-02-28 DIAGNOSIS — K21.9 CHRONIC GASTROESOPHAGEAL REFLUX DISEASE: ICD-10-CM

## 2023-02-28 DIAGNOSIS — G47.33 OSA TREATED WITH BIPAP: ICD-10-CM

## 2023-02-28 DIAGNOSIS — I10 PRIMARY HYPERTENSION: Primary | ICD-10-CM

## 2023-02-28 DIAGNOSIS — K22.2 ESOPHAGEAL STRICTURE: ICD-10-CM

## 2023-02-28 PROCEDURE — G8427 DOCREV CUR MEDS BY ELIG CLIN: HCPCS | Performed by: INTERNAL MEDICINE

## 2023-02-28 PROCEDURE — 99214 OFFICE O/P EST MOD 30 MIN: CPT | Performed by: INTERNAL MEDICINE

## 2023-02-28 PROCEDURE — 3017F COLORECTAL CA SCREEN DOC REV: CPT | Performed by: INTERNAL MEDICINE

## 2023-02-28 RX ORDER — LOSARTAN POTASSIUM AND HYDROCHLOROTHIAZIDE 25; 100 MG/1; MG/1
1 TABLET ORAL DAILY
Qty: 90 TABLET | Refills: 0 | Status: SHIPPED | OUTPATIENT
Start: 2023-02-28

## 2023-02-28 ASSESSMENT — PATIENT HEALTH QUESTIONNAIRE - PHQ9
SUM OF ALL RESPONSES TO PHQ QUESTIONS 1-9: 0
1. LITTLE INTEREST OR PLEASURE IN DOING THINGS: 0
SUM OF ALL RESPONSES TO PHQ9 QUESTIONS 1 & 2: 0
2. FEELING DOWN, DEPRESSED OR HOPELESS: 0

## 2023-02-28 NOTE — PATIENT INSTRUCTIONS
[facility]  [address], [address]  [place], [place] [address]  Phone: [phone]

## 2023-02-28 NOTE — PROGRESS NOTES
Pursuant to the emergency declaration under the [facility] waiver authority and the [employer] and [employer] Act, this Virtual  Video Visit was conducted, with patient's consent, to reduce the patient's risk of exposure to COVID-19 and provide continuity of care. Service is  provided through a video synchronous discussion virtually to substitute for in-person clinic visit with the patient being at home and Dr. Ethel Chery being at home. Patient consent to the video visit. Date of Service:  2023    Chief Complaint:      Chief Complaint   Patient presents with    Hypertension       Assessment/Plan:    Jose Godfrey was seen today for hypertension. Diagnoses and all orders for this visit:    Primary hypertension  -     losartan-hydroCHLOROthiazide (HYZAAR) 100-25 MG per tablet; Take 1 tablet by mouth daily    Risk for coronary artery disease between 10% and 20% in next 10 years  He will think about starting statin on next visit if unable to bring cholesterol down    Chronic gastroesophageal reflux disease    Esophageal stricture    NATE treated with BiPAP  -     [facility]      Return keep  Fasting PE.      HPI:  Eulalio Hassan is a 59 y.o. Hypertension:  Home blood pressure monitorin-130/80's on Losartan-hctz 100/25 mg daily. He is adherent to a low sodium diet. Patient denies chest pain, shortness of breath, headache, lightheadedness, blurred vision, peripheral edema, palpitations, dry cough and fatigue. Antihypertensive medication side effects: no medication side effects noted. Use of agents associated with hypertension: none. GERD with esophageal stricture:  Stable on Prilosec 40 mg qd                                   Environmental allergies:  Stable on zyrtec 10 mg daily  NATE on BIPAP  ADD:  Stable off medication. Hyperlipidemia:  Patient is  following a low fat, low cholesterol diet.   He is exercising regularly. OTC Supplements: none.      The 10-year ASCVD risk score (Diego PARSONS, et al., 2019) is: 10.5%    Values used to calculate the score:      Age: 59 years      Sex: Male      Is Non- : No      Diabetic: No      Tobacco smoker: No      Systolic Blood Pressure: 634 mmHg      Is BP treated: Yes      HDL Cholesterol: 45 mg/dL      Total Cholesterol: 220 mg/dL    No results found for: LABA1C, LABMICR  Lab Results   Component Value Date     05/25/2022    K 3.8 05/25/2022     05/25/2022    CO2 22 05/25/2022    BUN 17 05/25/2022    CREATININE 1.0 05/25/2022    GLUCOSE 104 (H) 05/25/2022    CALCIUM 10.1 05/25/2022     Lab Results   Component Value Date/Time    CHOL 220 05/25/2022 10:45 AM    TRIG 284 05/25/2022 10:45 AM    HDL 45 05/25/2022 10:45 AM    LDLCALC 118 05/25/2022 10:45 AM     Lab Results   Component Value Date    ALT 25 05/25/2022    AST 22 05/25/2022     No results found for: TSH, T4FREE, T3FREE  Lab Results   Component Value Date    WBC 6.7 05/25/2022    HGB 14.9 05/25/2022    HCT 44.1 05/25/2022    MCV 88.5 05/25/2022     05/25/2022     No results found for: INR   Lab Results   Component Value Date    PSA 1.29 07/23/2021      No results found for: [facility]     Patient Active Problem List   Diagnosis    Primary hypertension    Esophageal stricture    Chronic gastroesophageal reflux disease    Attention deficit disorder (ADD) without hyperactivity    Environmental and seasonal allergies    NATE treated with BiPAP       No Known Allergies  Outpatient Medications Marked as Taking for the 2/28/23 encounter (Telemedicine) with Pauline Torres MD   Medication Sig Dispense Refill    omeprazole (PRILOSEC) 40 MG delayed release capsule Take 40 mg by mouth daily      Multiple Vitamin (MULTIVITAMIN PO) Take by mouth daily      Cetirizine HCl 10 MG CAPS Take by mouth daily      losartan-hydroCHLOROthiazide (HYZAAR) 100-25 MG per tablet Take 1 tablet by mouth daily 90 tablet 1         Review of Systems: 14 systems were negative except of what was stated on HPI    Nursing note and vitals reviewed. There were no vitals filed for this visit. Wt Readings from Last 3 Encounters:   10/03/22 250 lb (113.4 kg)   05/25/22 252 lb (114.3 kg)     BP Readings from Last 3 Encounters:   10/03/22 125/86   05/25/22 112/68     There is no height or weight on file to calculate BMI. Constitutional: Patient appears well-developed and well-nourished. No distress. Head: Normocephalic and atraumatic. Psychiatric: Normal mood and affect.  Behavior is normal.

## 2023-05-26 DIAGNOSIS — I10 PRIMARY HYPERTENSION: ICD-10-CM

## 2023-05-30 RX ORDER — LOSARTAN POTASSIUM AND HYDROCHLOROTHIAZIDE 25; 100 MG/1; MG/1
TABLET ORAL
Qty: 90 TABLET | Refills: 0 | OUTPATIENT
Start: 2023-05-30

## 2023-06-01 ENCOUNTER — OFFICE VISIT (OUTPATIENT)
Dept: FAMILY MEDICINE CLINIC | Age: 58
End: 2023-06-01
Payer: COMMERCIAL

## 2023-06-01 VITALS
DIASTOLIC BLOOD PRESSURE: 70 MMHG | SYSTOLIC BLOOD PRESSURE: 115 MMHG | WEIGHT: 259.8 LBS | HEART RATE: 76 BPM | OXYGEN SATURATION: 98 % | BODY MASS INDEX: 35.19 KG/M2 | HEIGHT: 72 IN

## 2023-06-01 DIAGNOSIS — Z00.00 WELL ADULT EXAM: ICD-10-CM

## 2023-06-01 DIAGNOSIS — Z12.83 SCREENING EXAM FOR SKIN CANCER: ICD-10-CM

## 2023-06-01 DIAGNOSIS — K21.9 CHRONIC GASTROESOPHAGEAL REFLUX DISEASE: ICD-10-CM

## 2023-06-01 DIAGNOSIS — Z00.00 WELL ADULT EXAM: Primary | ICD-10-CM

## 2023-06-01 DIAGNOSIS — I10 PRIMARY HYPERTENSION: ICD-10-CM

## 2023-06-01 DIAGNOSIS — G47.33 OSA TREATED WITH BIPAP: ICD-10-CM

## 2023-06-01 PROCEDURE — 1036F TOBACCO NON-USER: CPT | Performed by: STUDENT IN AN ORGANIZED HEALTH CARE EDUCATION/TRAINING PROGRAM

## 2023-06-01 PROCEDURE — G8427 DOCREV CUR MEDS BY ELIG CLIN: HCPCS | Performed by: STUDENT IN AN ORGANIZED HEALTH CARE EDUCATION/TRAINING PROGRAM

## 2023-06-01 PROCEDURE — 99386 PREV VISIT NEW AGE 40-64: CPT | Performed by: STUDENT IN AN ORGANIZED HEALTH CARE EDUCATION/TRAINING PROGRAM

## 2023-06-01 PROCEDURE — 3078F DIAST BP <80 MM HG: CPT | Performed by: STUDENT IN AN ORGANIZED HEALTH CARE EDUCATION/TRAINING PROGRAM

## 2023-06-01 PROCEDURE — 99203 OFFICE O/P NEW LOW 30 MIN: CPT | Performed by: STUDENT IN AN ORGANIZED HEALTH CARE EDUCATION/TRAINING PROGRAM

## 2023-06-01 PROCEDURE — 3074F SYST BP LT 130 MM HG: CPT | Performed by: STUDENT IN AN ORGANIZED HEALTH CARE EDUCATION/TRAINING PROGRAM

## 2023-06-01 PROCEDURE — G8417 CALC BMI ABV UP PARAM F/U: HCPCS | Performed by: STUDENT IN AN ORGANIZED HEALTH CARE EDUCATION/TRAINING PROGRAM

## 2023-06-01 PROCEDURE — 3017F COLORECTAL CA SCREEN DOC REV: CPT | Performed by: STUDENT IN AN ORGANIZED HEALTH CARE EDUCATION/TRAINING PROGRAM

## 2023-06-01 RX ORDER — LOSARTAN POTASSIUM AND HYDROCHLOROTHIAZIDE 25; 100 MG/1; MG/1
1 TABLET ORAL DAILY
Qty: 90 TABLET | Refills: 1 | Status: SHIPPED | OUTPATIENT
Start: 2023-06-01

## 2023-06-01 RX ORDER — OMEPRAZOLE 40 MG/1
40 CAPSULE, DELAYED RELEASE ORAL DAILY
Qty: 90 CAPSULE | Refills: 1 | Status: SHIPPED | OUTPATIENT
Start: 2023-06-01

## 2023-06-01 NOTE — PROGRESS NOTES
also reviewed. Recent or new meds also reviewed. Ron Menjivar MD    This dictation was generated by voice recognition computer software. Although all attempts are made to edit the dictation for accuracy, there may be errors in the transcription that are not intended.

## 2023-06-02 LAB
ALBUMIN SERPL-MCNC: 5 G/DL (ref 3.4–5)
ALBUMIN/GLOB SERPL: 1.9 {RATIO} (ref 1.1–2.2)
ALP SERPL-CCNC: 86 U/L (ref 40–129)
ALT SERPL-CCNC: 26 U/L (ref 10–40)
ANION GAP SERPL CALCULATED.3IONS-SCNC: 17 MMOL/L (ref 3–16)
AST SERPL-CCNC: 24 U/L (ref 15–37)
BILIRUB SERPL-MCNC: 0.5 MG/DL (ref 0–1)
BUN SERPL-MCNC: 16 MG/DL (ref 7–20)
CALCIUM SERPL-MCNC: 10 MG/DL (ref 8.3–10.6)
CHLORIDE SERPL-SCNC: 99 MMOL/L (ref 99–110)
CHOLEST SERPL-MCNC: 223 MG/DL (ref 0–199)
CO2 SERPL-SCNC: 24 MMOL/L (ref 21–32)
CREAT SERPL-MCNC: 1 MG/DL (ref 0.9–1.3)
EST. AVERAGE GLUCOSE BLD GHB EST-MCNC: 111.2 MG/DL
GFR SERPLBLD CREATININE-BSD FMLA CKD-EPI: >60 ML/MIN/{1.73_M2}
GLUCOSE SERPL-MCNC: 100 MG/DL (ref 70–99)
HBA1C MFR BLD: 5.5 %
HDLC SERPL-MCNC: 41 MG/DL (ref 40–60)
LDL CHOLESTEROL CALCULATED: 132 MG/DL
POTASSIUM SERPL-SCNC: 4 MMOL/L (ref 3.5–5.1)
PROT SERPL-MCNC: 7.6 G/DL (ref 6.4–8.2)
PROT UR-MCNC: <4 MG/DL
PSA SERPL DL<=0.01 NG/ML-MCNC: 1.49 NG/ML (ref 0–4)
SODIUM SERPL-SCNC: 140 MMOL/L (ref 136–145)
TRIGL SERPL-MCNC: 250 MG/DL (ref 0–150)
VLDLC SERPL CALC-MCNC: 50 MG/DL

## 2023-06-12 ENCOUNTER — TELEPHONE (OUTPATIENT)
Dept: PULMONOLOGY | Age: 58
End: 2023-06-12

## 2023-08-14 ENCOUNTER — OFFICE VISIT (OUTPATIENT)
Dept: PULMONOLOGY | Age: 58
End: 2023-08-14
Payer: COMMERCIAL

## 2023-08-14 VITALS
SYSTOLIC BLOOD PRESSURE: 132 MMHG | DIASTOLIC BLOOD PRESSURE: 74 MMHG | OXYGEN SATURATION: 97 % | RESPIRATION RATE: 14 BRPM | BODY MASS INDEX: 32.91 KG/M2 | HEART RATE: 89 BPM | WEIGHT: 243 LBS | HEIGHT: 72 IN

## 2023-08-14 DIAGNOSIS — G47.31 CSA (CENTRAL SLEEP APNEA): ICD-10-CM

## 2023-08-14 DIAGNOSIS — G47.33 SEVERE OBSTRUCTIVE SLEEP APNEA: Primary | ICD-10-CM

## 2023-08-14 DIAGNOSIS — E66.9 OBESITY (BMI 30-39.9): ICD-10-CM

## 2023-08-14 DIAGNOSIS — I10 PRIMARY HYPERTENSION: ICD-10-CM

## 2023-08-14 PROCEDURE — 99204 OFFICE O/P NEW MOD 45 MIN: CPT | Performed by: NURSE PRACTITIONER

## 2023-08-14 PROCEDURE — 3075F SYST BP GE 130 - 139MM HG: CPT | Performed by: NURSE PRACTITIONER

## 2023-08-14 PROCEDURE — 3078F DIAST BP <80 MM HG: CPT | Performed by: NURSE PRACTITIONER

## 2023-08-14 PROCEDURE — G8417 CALC BMI ABV UP PARAM F/U: HCPCS | Performed by: NURSE PRACTITIONER

## 2023-08-14 PROCEDURE — G8427 DOCREV CUR MEDS BY ELIG CLIN: HCPCS | Performed by: NURSE PRACTITIONER

## 2023-08-14 ASSESSMENT — SLEEP AND FATIGUE QUESTIONNAIRES
NECK CIRCUMFERENCE (INCHES): 17.5
HOW LIKELY ARE YOU TO NOD OFF OR FALL ASLEEP WHILE SITTING AND TALKING TO SOMEONE: 0
HOW LIKELY ARE YOU TO NOD OFF OR FALL ASLEEP WHILE SITTING AND READING: 1
HOW LIKELY ARE YOU TO NOD OFF OR FALL ASLEEP WHILE WATCHING TV: 1
HOW LIKELY ARE YOU TO NOD OFF OR FALL ASLEEP WHILE LYING DOWN TO REST IN THE AFTERNOON WHEN CIRCUMSTANCES PERMIT: 2
HOW LIKELY ARE YOU TO NOD OFF OR FALL ASLEEP IN A CAR, WHILE STOPPED FOR A FEW MINUTES IN TRAFFIC: 0
HOW LIKELY ARE YOU TO NOD OFF OR FALL ASLEEP WHILE SITTING QUIETLY AFTER LUNCH WITHOUT ALCOHOL: 3
ESS TOTAL SCORE: 8
HOW LIKELY ARE YOU TO NOD OFF OR FALL ASLEEP WHEN YOU ARE A PASSENGER IN A CAR FOR AN HOUR WITHOUT A BREAK: 0
HOW LIKELY ARE YOU TO NOD OFF OR FALL ASLEEP WHILE SITTING INACTIVE IN A PUBLIC PLACE: 1

## 2023-08-14 NOTE — PROGRESS NOTES
H2O    Assessment:       Severe NATE. ASV EPAP minimum 5, pressure support minimum 3, pressure support maximum 15 cm H2O. Optimal compliance and efficacy on review today  CSA  Snoring-resolved on ASV  Observed sleep apnea-resolved on ASV  Fatigue  Morning headache-resolved on ASV  Obesity  Hypertension        Plan:      Continue ASV EPAP minimum 5, pressure support minimum 3, pressure support maximum 15 cm H2O  Interpreted and reviewed ASV download data with patient  Advised to use ASV 6-8 hrs at night and during naps. Discussed recommended cleaning and replacement of ASV supplies  Replacement of mask, tubing, head straps every 3-6 months or sooner if damaged. Patient instructed to contact [employer] for any mask, tubing or machine trouble shooting if problems arise. Sleep hygiene  Avoid sedatives, alcohol and caffeinated drinks at bed time. No driving motorized vehicles or operating heavy machinery while fatigue, drowsy or sleepy. Weight loss is also recommended as a long-term intervention. Complications of NATE if not treated were discussed with patient patient to include systemic hypertension, pulmonary hypertension, cardiovascular morbidities, car accidents and all cause mortality. Discussed pathophysiology of NATE with patient today  Patient education handout provided regarding sleep tips and PAP cleaning recommendations   Continue blood pressure medications as ordered by treating provider- treatment of NATE can lower blood pressure by levels that are clinically significant.       Follow-up: 1 year, sooner if needed

## 2023-11-25 DIAGNOSIS — I10 PRIMARY HYPERTENSION: ICD-10-CM

## 2023-11-27 RX ORDER — LOSARTAN POTASSIUM AND HYDROCHLOROTHIAZIDE 25; 100 MG/1; MG/1
1 TABLET ORAL DAILY
Qty: 90 TABLET | Refills: 0 | Status: SHIPPED | OUTPATIENT
Start: 2023-11-27

## 2024-01-06 DIAGNOSIS — K21.9 CHRONIC GASTROESOPHAGEAL REFLUX DISEASE: ICD-10-CM

## 2024-01-08 ENCOUNTER — OFFICE VISIT (OUTPATIENT)
Age: 59
End: 2024-01-08

## 2024-01-08 VITALS
TEMPERATURE: 97.6 F | HEART RATE: 107 BPM | OXYGEN SATURATION: 97 % | SYSTOLIC BLOOD PRESSURE: 130 MMHG | DIASTOLIC BLOOD PRESSURE: 77 MMHG

## 2024-01-08 DIAGNOSIS — R10.32 LEFT LOWER QUADRANT ABDOMINAL PAIN: Primary | ICD-10-CM

## 2024-01-08 LAB
BILIRUBIN, POC: NORMAL
BLOOD URINE, POC: NORMAL
CLARITY, POC: CLEAR
COLOR, POC: YELLOW
GLUCOSE URINE, POC: NORMAL
KETONES, POC: NORMAL
LEUKOCYTE EST, POC: NORMAL
NITRITE, POC: NORMAL
PH, POC: 6.5
PROTEIN, POC: NORMAL
SPECIFIC GRAVITY, POC: <=1.005
UROBILINOGEN, POC: 0.2

## 2024-01-08 RX ORDER — OMEPRAZOLE 40 MG/1
40 CAPSULE, DELAYED RELEASE ORAL DAILY
Qty: 90 CAPSULE | Refills: 0 | Status: SHIPPED | OUTPATIENT
Start: 2024-01-08

## 2024-01-08 ASSESSMENT — ENCOUNTER SYMPTOMS
SINUS PRESSURE: 0
CONSTIPATION: 0
CHEST TIGHTNESS: 0
DIARRHEA: 0
VOMITING: 0
ABDOMINAL PAIN: 1
NAUSEA: 0
COUGH: 0
WHEEZING: 0
SHORTNESS OF BREATH: 0
SORE THROAT: 0

## 2024-01-08 NOTE — PATIENT INSTRUCTIONS
Please call your family doctor tomorrow for further evaluation  Please go to ED if you develop sharp stabbing pain.

## 2024-01-10 ENCOUNTER — TELEMEDICINE (OUTPATIENT)
Dept: FAMILY MEDICINE CLINIC | Age: 59
End: 2024-01-10
Payer: COMMERCIAL

## 2024-01-10 DIAGNOSIS — R10.32 LEFT LOWER QUADRANT ABDOMINAL PAIN: Primary | ICD-10-CM

## 2024-01-10 PROCEDURE — 3017F COLORECTAL CA SCREEN DOC REV: CPT | Performed by: STUDENT IN AN ORGANIZED HEALTH CARE EDUCATION/TRAINING PROGRAM

## 2024-01-10 PROCEDURE — 99213 OFFICE O/P EST LOW 20 MIN: CPT | Performed by: STUDENT IN AN ORGANIZED HEALTH CARE EDUCATION/TRAINING PROGRAM

## 2024-01-10 PROCEDURE — G8427 DOCREV CUR MEDS BY ELIG CLIN: HCPCS | Performed by: STUDENT IN AN ORGANIZED HEALTH CARE EDUCATION/TRAINING PROGRAM

## 2024-01-10 ASSESSMENT — PATIENT HEALTH QUESTIONNAIRE - PHQ9
2. FEELING DOWN, DEPRESSED OR HOPELESS: 0
SUM OF ALL RESPONSES TO PHQ9 QUESTIONS 1 & 2: 0
SUM OF ALL RESPONSES TO PHQ QUESTIONS 1-9: 0
1. LITTLE INTEREST OR PLEASURE IN DOING THINGS: 0
SUM OF ALL RESPONSES TO PHQ QUESTIONS 1-9: 0

## 2024-01-10 NOTE — PROGRESS NOTES
Neil Ross, was evaluated through a synchronous (real-time) audio-video encounter. The patient (or guardian if applicable) is aware that this is a billable service, which includes applicable co-pays. This Virtual Visit was conducted with patient's (and/or legal guardian's) consent. Patient identification was verified, and a caregiver was present when appropriate.   The patient was located at Home: [address] [place] [id number]  Provider was located at Facility (Appt Dept): [address] [place], [place] [address]e [place] [phone]      Neil Ross (:  1965) is a Established patient, presenting virtually for evaluation of the following:    Assessment & Plan   Below is the assessment and plan developed based on review of pertinent history, physical exam, labs, studies, and medications.  1. Left lower quadrant abdominal pain  Given patient's history of diverticulosis, combined with left lower quadrant pain, absence of vomiting, it is quite likely that the patient has had a bout of diverticulitis.  Given that patient is almost better, there may be limited benefit to obtaining CRP at this time.  Discussed with the patient that if this abdominal pain returns in the future, we should consider getting a CRP and a CT scan that may demonstrate his diverticulitis.  We also may try empiric antibiotics in the future as a strategy as well.  As far as this bout of diverticulitis, I suspect the patient will improve on his own.  Gave patient instructions for when to call back or when to seek a higher level of care. Utilize shared decision making in the above decision-making process.      No follow-ups on file.       Subjective   HPI  Patient is a 58-year-old male who presents over the telephone for A/V communication to discuss recent symptoms.  Patient states that he recently went to the urgent care and described left lower quadrant abdominal pain that he has had off and on for some time.  Patient states that this will typically

## 2024-02-29 DIAGNOSIS — I10 PRIMARY HYPERTENSION: ICD-10-CM

## 2024-02-29 DIAGNOSIS — K21.9 CHRONIC GASTROESOPHAGEAL REFLUX DISEASE: ICD-10-CM

## 2024-02-29 RX ORDER — LOSARTAN POTASSIUM AND HYDROCHLOROTHIAZIDE 25; 100 MG/1; MG/1
1 TABLET ORAL DAILY
Qty: 90 TABLET | Refills: 0 | Status: SHIPPED | OUTPATIENT
Start: 2024-02-29

## 2024-02-29 RX ORDER — OMEPRAZOLE 40 MG/1
40 CAPSULE, DELAYED RELEASE ORAL DAILY
Qty: 90 CAPSULE | Refills: 0 | Status: SHIPPED | OUTPATIENT
Start: 2024-02-29

## 2024-02-29 NOTE — TELEPHONE ENCOUNTER
Last Office Visit  -  12-  Next Office Visit  -  6-5-2024    Last Filled  -    Last UDS -    Contract -        Refill losartan-hydrochlorothiazide 100-25 mg     Omeprazole 40 mg     Pharmacy swathi waite

## 2024-05-28 ENCOUNTER — TELEPHONE (OUTPATIENT)
Dept: FAMILY MEDICINE CLINIC | Age: 59
End: 2024-05-28

## 2024-05-28 DIAGNOSIS — I10 PRIMARY HYPERTENSION: Primary | ICD-10-CM

## 2024-05-28 RX ORDER — LOSARTAN POTASSIUM AND HYDROCHLOROTHIAZIDE 25; 100 MG/1; MG/1
1 TABLET ORAL DAILY
Qty: 90 TABLET | Refills: 3 | Status: SHIPPED | OUTPATIENT
Start: 2024-05-28

## 2024-05-28 NOTE — TELEPHONE ENCOUNTER
Francisca waite req refill for patient on  Losaran tabs  Last visit 1/10/24  Future 6/5/24  Francisca waite

## 2024-05-28 NOTE — TELEPHONE ENCOUNTER
Last Office Visit  -  1/10/24  Next Office Visit  -  6/5/24    Last Filled  -  2/29/24  Last UDS -    Contract -

## 2024-06-05 ENCOUNTER — OFFICE VISIT (OUTPATIENT)
Dept: FAMILY MEDICINE CLINIC | Age: 59
End: 2024-06-05
Payer: COMMERCIAL

## 2024-06-05 VITALS
OXYGEN SATURATION: 98 % | WEIGHT: 253.4 LBS | HEIGHT: 72 IN | HEART RATE: 71 BPM | SYSTOLIC BLOOD PRESSURE: 134 MMHG | BODY MASS INDEX: 34.32 KG/M2 | DIASTOLIC BLOOD PRESSURE: 70 MMHG

## 2024-06-05 DIAGNOSIS — I10 PRIMARY HYPERTENSION: ICD-10-CM

## 2024-06-05 DIAGNOSIS — Z23 NEED FOR TDAP VACCINATION: ICD-10-CM

## 2024-06-05 DIAGNOSIS — K21.9 CHRONIC GASTROESOPHAGEAL REFLUX DISEASE: ICD-10-CM

## 2024-06-05 DIAGNOSIS — Z00.00 WELL ADULT EXAM: Primary | ICD-10-CM

## 2024-06-05 DIAGNOSIS — Z00.00 WELL ADULT EXAM: ICD-10-CM

## 2024-06-05 DIAGNOSIS — G47.33 OSA TREATED WITH BIPAP: ICD-10-CM

## 2024-06-05 LAB
ALBUMIN SERPL-MCNC: 4.7 G/DL (ref 3.4–5)
ALBUMIN/GLOB SERPL: 1.9 {RATIO} (ref 1.1–2.2)
ALP SERPL-CCNC: 98 U/L (ref 40–129)
ALT SERPL-CCNC: 30 U/L (ref 10–40)
ANION GAP SERPL CALCULATED.3IONS-SCNC: 18 MMOL/L (ref 3–16)
AST SERPL-CCNC: 28 U/L (ref 15–37)
BILIRUB SERPL-MCNC: 0.6 MG/DL (ref 0–1)
BUN SERPL-MCNC: 15 MG/DL (ref 7–20)
CALCIUM SERPL-MCNC: 10.3 MG/DL (ref 8.3–10.6)
CHLORIDE SERPL-SCNC: 96 MMOL/L (ref 99–110)
CHOLEST SERPL-MCNC: 206 MG/DL (ref 0–199)
CO2 SERPL-SCNC: 25 MMOL/L (ref 21–32)
CREAT SERPL-MCNC: 0.8 MG/DL (ref 0.8–1.3)
CREAT UR-MCNC: 202 MG/DL (ref 39–259)
GFR SERPLBLD CREATININE-BSD FMLA CKD-EPI: >90 ML/MIN/{1.73_M2}
GLUCOSE SERPL-MCNC: 97 MG/DL (ref 70–99)
HDLC SERPL-MCNC: 46 MG/DL (ref 40–60)
LDL CHOLESTEROL: 119 MG/DL
MICROALBUMIN UR DL<=1MG/L-MCNC: 2.3 MG/DL
MICROALBUMIN/CREAT UR: 11.4 MG/G (ref 0–30)
POTASSIUM SERPL-SCNC: 3.8 MMOL/L (ref 3.5–5.1)
PROT SERPL-MCNC: 7.2 G/DL (ref 6.4–8.2)
PSA SERPL DL<=0.01 NG/ML-MCNC: 1.63 NG/ML (ref 0–4)
TRIGL SERPL-MCNC: 207 MG/DL (ref 0–150)
VLDLC SERPL CALC-MCNC: 41 MG/DL

## 2024-06-05 PROCEDURE — 3078F DIAST BP <80 MM HG: CPT | Performed by: STUDENT IN AN ORGANIZED HEALTH CARE EDUCATION/TRAINING PROGRAM

## 2024-06-05 PROCEDURE — 1036F TOBACCO NON-USER: CPT | Performed by: STUDENT IN AN ORGANIZED HEALTH CARE EDUCATION/TRAINING PROGRAM

## 2024-06-05 PROCEDURE — G8427 DOCREV CUR MEDS BY ELIG CLIN: HCPCS | Performed by: STUDENT IN AN ORGANIZED HEALTH CARE EDUCATION/TRAINING PROGRAM

## 2024-06-05 PROCEDURE — 90471 IMMUNIZATION ADMIN: CPT | Performed by: STUDENT IN AN ORGANIZED HEALTH CARE EDUCATION/TRAINING PROGRAM

## 2024-06-05 PROCEDURE — G8417 CALC BMI ABV UP PARAM F/U: HCPCS | Performed by: STUDENT IN AN ORGANIZED HEALTH CARE EDUCATION/TRAINING PROGRAM

## 2024-06-05 PROCEDURE — 3075F SYST BP GE 130 - 139MM HG: CPT | Performed by: STUDENT IN AN ORGANIZED HEALTH CARE EDUCATION/TRAINING PROGRAM

## 2024-06-05 PROCEDURE — 99214 OFFICE O/P EST MOD 30 MIN: CPT | Performed by: STUDENT IN AN ORGANIZED HEALTH CARE EDUCATION/TRAINING PROGRAM

## 2024-06-05 PROCEDURE — 90715 TDAP VACCINE 7 YRS/> IM: CPT | Performed by: STUDENT IN AN ORGANIZED HEALTH CARE EDUCATION/TRAINING PROGRAM

## 2024-06-05 PROCEDURE — 3017F COLORECTAL CA SCREEN DOC REV: CPT | Performed by: STUDENT IN AN ORGANIZED HEALTH CARE EDUCATION/TRAINING PROGRAM

## 2024-06-05 PROCEDURE — 99396 PREV VISIT EST AGE 40-64: CPT | Performed by: STUDENT IN AN ORGANIZED HEALTH CARE EDUCATION/TRAINING PROGRAM

## 2024-06-05 RX ORDER — OMEPRAZOLE 40 MG/1
40 CAPSULE, DELAYED RELEASE ORAL DAILY
Qty: 90 CAPSULE | Refills: 3 | Status: SHIPPED | OUTPATIENT
Start: 2024-06-05

## 2024-06-05 RX ORDER — LOSARTAN POTASSIUM AND HYDROCHLOROTHIAZIDE 25; 100 MG/1; MG/1
1 TABLET ORAL DAILY
Qty: 90 TABLET | Refills: 0 | Status: CANCELLED | OUTPATIENT
Start: 2024-06-05 | End: 2024-09-03

## 2024-06-05 SDOH — ECONOMIC STABILITY: FOOD INSECURITY: WITHIN THE PAST 12 MONTHS, THE FOOD YOU BOUGHT JUST DIDN'T LAST AND YOU DIDN'T HAVE MONEY TO GET MORE.: NEVER TRUE

## 2024-06-05 SDOH — ECONOMIC STABILITY: FOOD INSECURITY: WITHIN THE PAST 12 MONTHS, YOU WORRIED THAT YOUR FOOD WOULD RUN OUT BEFORE YOU GOT MONEY TO BUY MORE.: NEVER TRUE

## 2024-06-05 NOTE — PROGRESS NOTES
[facility]  Establish care visit   2024    Neil Ross (:  1965) is a 58 y.o. male, here to establish care.    Chief Complaint   Patient presents with    Annual Exam        ASSESSMENT/ PLAN  1. Well adult exam  General wellness exam. Reviewed chart for past hx and updated today. Counseled on age appropriate health guidance and discussed screening recommendations. Vaccinations reviewed and discussed. All questions answered  - Hemoglobin A1C; Future  - Lipid, Fasting; Future  - Comprehensive Metabolic Panel; Future  - PSA Screening; Future    2. Need for Tdap vaccination  - Tdap, BOOSTRIX, (age 10 yrs+), IM    3. Primary hypertension  Chronic.  Stable.  On losartan hydrochlorothiazide.  Does not need a refill.  Continue this medication at this time.  Lab work as below.  - MICROALBUMIN / CREATININE URINE RATIO; Future  - Lipid, Fasting; Future  - Comprehensive Metabolic Panel; Future    4. Chronic gastroesophageal reflux disease  Chronic.  Stable.  On Prilosec.  Needs refill.  Will provide at this time.  - omeprazole (PRILOSEC) 40 MG delayed release capsule; Take 1 capsule by mouth daily  Dispense: 90 capsule; Refill: 3    5. NATE treated with BiPAP  Chronic.  Stable.  On BiPAP.  No difficulties with this.       No follow-ups on file.    HPI  Patient is a 58-year-old male, who presents to clinic for a well adult visit and to follow-up for chronic diseases including hypertension, GERD, NATE.    In regard to his well adult visit, the patient reports that he does not follow any particular diet, he eats from all major food groups.  Regarding exercise, he likes to work around the house and occasionally works out at his home gym, although he utilizes this gym less than he should.  Patient also states that he has picked up pickleball and does this approximately once per week.  Regarding vaccinations, he is up-to-date on childhood vaccines and boosters other than his tetanus booster which she

## 2024-06-06 LAB
EST. AVERAGE GLUCOSE BLD GHB EST-MCNC: 108.3 MG/DL
HBA1C MFR BLD: 5.4 %

## 2024-07-08 ENCOUNTER — TELEPHONE (OUTPATIENT)
Dept: PULMONOLOGY | Age: 59
End: 2024-07-08

## 2024-07-08 NOTE — TELEPHONE ENCOUNTER
I recommend regular follow-up to ensure sleep apnea is adequately controlled on current pressures and ensure patient is doing well with ASV.  Some insurances also require a yearly visit to continue to receive supplies.

## 2024-07-08 NOTE — TELEPHONE ENCOUNTER
Patient cancelled appointment on 8/12 with Denise Vasquez for 1 year sleep.    Reason: Got VM to r/s provider not in office. Patient does not feel like he is having issues and does not was to reschedule at this time.    Patient did not reschedule appointment.    Last OV 8/14   Assessment:       Severe NATE.  ASV EPAP minimum 5, pressure support minimum 3, pressure support maximum 15 cm H2O.  Optimal compliance and efficacy on review today  CSA  Snoring-resolved on ASV  Observed sleep apnea-resolved on ASV  Fatigue  Morning headache-resolved on ASV  Obesity  Hypertension         Plan:      Continue ASV EPAP minimum 5, pressure support minimum 3, pressure support maximum 15 cm H2O  Interpreted and reviewed ASV download data with patient  Advised to use ASV 6-8 hrs at night and during naps.  Discussed recommended cleaning and replacement of ASV supplies  Replacement of mask, tubing, head straps every 3-6 months or sooner if damaged.   Patient instructed to contact [employer] company for any mask, tubing or machine trouble shooting if problems arise.  Sleep hygiene  Avoid sedatives, alcohol and caffeinated drinks at bed time.  No driving motorized vehicles or operating heavy machinery while fatigue, drowsy or sleepy.   Weight loss is also recommended as a long-term intervention.    Complications of NATE if not treated were discussed with patient patient to include systemic hypertension, pulmonary hypertension, cardiovascular morbidities, car accidents and all cause mortality.  Discussed pathophysiology of NATE with patient today  Patient education handout provided regarding sleep tips and PAP cleaning recommendations   Continue blood pressure medications as ordered by treating provider- treatment of NATE can lower blood pressure by levels that are clinically significant.       Follow-up: 1 year, sooner if needed

## 2024-07-09 NOTE — TELEPHONE ENCOUNTER
Noted.  Patient did not keep follow up appointment as was recommended.  Please schedule a follow up visit for this patient if he calls requesting such appointment.

## 2024-07-09 NOTE — TELEPHONE ENCOUNTER
Spoke to patient, he is aware of recommendations.   He states he is having no issues and will call and schedule if/when he needs an appt.   He gets his supplies at 1/3 of the cost of what the DME companies charge with his insurance. So he said he is good right now.     Forwarding to provider for review

## 2024-07-11 ENCOUNTER — TELEPHONE (OUTPATIENT)
Dept: FAMILY MEDICINE CLINIC | Age: 59
End: 2024-07-11

## 2024-07-11 DIAGNOSIS — E78.2 MIXED HYPERLIPIDEMIA: Primary | ICD-10-CM

## 2024-07-11 RX ORDER — ATORVASTATIN CALCIUM 10 MG/1
10 TABLET, FILM COATED ORAL DAILY
Qty: 90 TABLET | Refills: 1 | Status: SHIPPED | OUTPATIENT
Start: 2024-07-11

## 2024-07-11 NOTE — TELEPHONE ENCOUNTER
Patient states it was advised that he start a cholesterol medication. Patient states the pharmacy did not receive a rx. Advise

## 2025-01-02 DIAGNOSIS — E78.2 MIXED HYPERLIPIDEMIA: ICD-10-CM

## 2025-01-02 RX ORDER — ATORVASTATIN CALCIUM 10 MG/1
10 TABLET, FILM COATED ORAL DAILY
Qty: 90 TABLET | Refills: 1 | Status: SHIPPED | OUTPATIENT
Start: 2025-01-02

## 2025-05-25 DIAGNOSIS — I10 PRIMARY HYPERTENSION: ICD-10-CM

## 2025-05-27 RX ORDER — LOSARTAN POTASSIUM AND HYDROCHLOROTHIAZIDE 25; 100 MG/1; MG/1
1 TABLET ORAL DAILY
Qty: 90 TABLET | Refills: 0 | Status: SHIPPED | OUTPATIENT
Start: 2025-05-27

## 2025-06-06 ENCOUNTER — OFFICE VISIT (OUTPATIENT)
Dept: FAMILY MEDICINE CLINIC | Age: 60
End: 2025-06-06

## 2025-06-06 VITALS
OXYGEN SATURATION: 98 % | WEIGHT: 253 LBS | DIASTOLIC BLOOD PRESSURE: 70 MMHG | HEART RATE: 70 BPM | HEIGHT: 72 IN | SYSTOLIC BLOOD PRESSURE: 120 MMHG | BODY MASS INDEX: 34.27 KG/M2

## 2025-06-06 DIAGNOSIS — E78.2 MIXED HYPERLIPIDEMIA: ICD-10-CM

## 2025-06-06 DIAGNOSIS — I10 PRIMARY HYPERTENSION: ICD-10-CM

## 2025-06-06 DIAGNOSIS — Z00.00 WELL ADULT EXAM: Primary | ICD-10-CM

## 2025-06-06 DIAGNOSIS — K21.9 CHRONIC GASTROESOPHAGEAL REFLUX DISEASE: ICD-10-CM

## 2025-06-06 DIAGNOSIS — G47.33 OSA TREATED WITH BIPAP: ICD-10-CM

## 2025-06-06 RX ORDER — ATORVASTATIN CALCIUM 10 MG/1
10 TABLET, FILM COATED ORAL DAILY
Qty: 90 TABLET | Refills: 3 | Status: SHIPPED | OUTPATIENT
Start: 2025-06-06

## 2025-06-06 RX ORDER — OMEPRAZOLE 40 MG/1
40 CAPSULE, DELAYED RELEASE ORAL DAILY
Qty: 90 CAPSULE | Refills: 3 | Status: SHIPPED | OUTPATIENT
Start: 2025-06-06

## 2025-06-06 RX ORDER — LOSARTAN POTASSIUM AND HYDROCHLOROTHIAZIDE 25; 100 MG/1; MG/1
1 TABLET ORAL DAILY
Qty: 90 TABLET | Refills: 3 | Status: SHIPPED | OUTPATIENT
Start: 2025-06-06

## 2025-06-06 SDOH — ECONOMIC STABILITY: FOOD INSECURITY: WITHIN THE PAST 12 MONTHS, YOU WORRIED THAT YOUR FOOD WOULD RUN OUT BEFORE YOU GOT MONEY TO BUY MORE.: NEVER TRUE

## 2025-06-06 SDOH — ECONOMIC STABILITY: FOOD INSECURITY: WITHIN THE PAST 12 MONTHS, THE FOOD YOU BOUGHT JUST DIDN'T LAST AND YOU DIDN'T HAVE MONEY TO GET MORE.: NEVER TRUE

## 2025-06-06 ASSESSMENT — PATIENT HEALTH QUESTIONNAIRE - PHQ9
SUM OF ALL RESPONSES TO PHQ QUESTIONS 1-9: 0
2. FEELING DOWN, DEPRESSED OR HOPELESS: NOT AT ALL
SUM OF ALL RESPONSES TO PHQ QUESTIONS 1-9: 0
1. LITTLE INTEREST OR PLEASURE IN DOING THINGS: NOT AT ALL

## 2025-06-06 NOTE — PROGRESS NOTES
10/04/2019, 09/22/2020    Influenza, FLUARIX, FLULAVAL, FLUZONE (age 6 mo+) and AFLURIA, (age 3 y+), Quadv PF, 0.5mL 11/15/2023    Influenza, FLUBLOK, (age 18 y+), IM, Trivalent PF, 0.5mL 01/10/2025    Influenza, FLUBLOK, (age 18 y+), Quadv PF, 0.5mL 10/15/2021, 10/31/2022    Pneumococcal, PCV20, PREVNAR 20, (age 6w+), IM, 0.5mL 06/06/2025    TDaP, ADACEL (age 10y-64y), BOOSTRIX (age 10y+), IM, 0.5mL 09/16/2013, 06/05/2024    Zoster Recombinant (Shingrix) 02/27/2020, 01/01/2021       Health Maintenance   Topic Date Due    Lipids  06/05/2025    Colorectal Cancer Screen  10/03/2025    Depression Screen  06/06/2026    DTaP/Tdap/Td vaccine (3 - Td or Tdap) 06/05/2034    Respiratory Syncytial Virus (RSV) Pregnant or age 60 yrs+ (1 - 1-dose 75+ series) 07/18/2038    Flu vaccine  Completed    Shingles vaccine  Completed    Pneumococcal 50+ years Vaccine  Completed    COVID-19 Vaccine  Completed    Hepatitis C screen  Completed    HIV screen  Completed    Hepatitis A vaccine  Aged Out    Hepatitis B vaccine  Aged Out    Hib vaccine  Aged Out    Polio vaccine  Aged Out    Meningococcal (ACWY) vaccine  Aged Out    Meningococcal B vaccine  Aged Out    Pneumococcal 0-49 years Vaccine  Discontinued    Diabetes screen  Discontinued    Prostate Specific Antigen (PSA) Screening or Monitoring  Discontinued       PSH, PMH, SH and FH reviewed and noted.  Recent and past labs, tests and consults also reviewed.  Recent or new meds also reviewed.    Ammon Mims MD    This dictation was generated by voice recognition computer software.  Although all attempts are made to edit the dictation for accuracy, there may be errors in the transcription that are not intended.

## 2025-06-10 DIAGNOSIS — Z00.00 WELL ADULT EXAM: ICD-10-CM

## 2025-06-10 LAB
ALBUMIN SERPL-MCNC: 4.8 G/DL (ref 3.4–5)
ALBUMIN/GLOB SERPL: 2.1 {RATIO} (ref 1.1–2.2)
ALP SERPL-CCNC: 96 U/L (ref 40–129)
ALT SERPL-CCNC: 35 U/L (ref 10–40)
ANION GAP SERPL CALCULATED.3IONS-SCNC: 11 MMOL/L (ref 3–16)
AST SERPL-CCNC: 32 U/L (ref 15–37)
BILIRUB SERPL-MCNC: 0.6 MG/DL (ref 0–1)
BUN SERPL-MCNC: 16 MG/DL (ref 7–20)
CALCIUM SERPL-MCNC: 9.5 MG/DL (ref 8.3–10.6)
CHLORIDE SERPL-SCNC: 100 MMOL/L (ref 99–110)
CHOLEST SERPL-MCNC: 142 MG/DL (ref 0–199)
CO2 SERPL-SCNC: 29 MMOL/L (ref 21–32)
CREAT SERPL-MCNC: 1 MG/DL (ref 0.8–1.3)
GFR SERPLBLD CREATININE-BSD FMLA CKD-EPI: 85 ML/MIN/{1.73_M2}
GLUCOSE SERPL-MCNC: 103 MG/DL (ref 70–99)
HDLC SERPL-MCNC: 43 MG/DL (ref 40–60)
LDL CHOLESTEROL: 59 MG/DL
POTASSIUM SERPL-SCNC: 3.4 MMOL/L (ref 3.5–5.1)
PROT SERPL-MCNC: 7.1 G/DL (ref 6.4–8.2)
PSA SERPL DL<=0.01 NG/ML-MCNC: 1.74 NG/ML (ref 0–4)
SODIUM SERPL-SCNC: 140 MMOL/L (ref 136–145)
TRIGL SERPL-MCNC: 200 MG/DL (ref 0–150)
VLDLC SERPL CALC-MCNC: 40 MG/DL

## 2025-06-11 ENCOUNTER — RESULTS FOLLOW-UP (OUTPATIENT)
Dept: FAMILY MEDICINE CLINIC | Age: 60
End: 2025-06-11

## 2025-06-11 LAB
EST. AVERAGE GLUCOSE BLD GHB EST-MCNC: 111.2 MG/DL
HBA1C MFR BLD: 5.5 %

## 2025-07-01 DIAGNOSIS — K21.9 CHRONIC GASTROESOPHAGEAL REFLUX DISEASE: ICD-10-CM

## 2025-07-01 RX ORDER — OMEPRAZOLE 40 MG/1
40 CAPSULE, DELAYED RELEASE ORAL DAILY
Qty: 90 CAPSULE | Refills: 3 | Status: SHIPPED | OUTPATIENT
Start: 2025-07-01

## (undated) DEVICE — ENDOSCOPIC KIT 2 12 FT OP4 DE2 GWN SYR

## (undated) DEVICE — SUBMUCOSAL LIFTING AGENT WITH NEEDLE.: Brand: ORISE™ GEL

## (undated) DEVICE — FORCEPS BX L240CM JAW DIA2.8MM L CAP W/ NDL MIC MESH TOOTH

## (undated) DEVICE — ELECTRODE,ECG,STRESS,FOAM,3PK: Brand: MEDLINE

## (undated) DEVICE — "SD-221U-25 DISP.CRESCENT SNARE-25MM": Brand: DISPOSABLE ELECTROSURGICAL SNARE

## (undated) DEVICE — CLIP LIG L235CM RESOL 360 BX/20

## (undated) DEVICE — CANNULA NSL AD TBNG L7FT PVC STR NONFLARED PRNG O2 DEL W STD